# Patient Record
Sex: FEMALE | URBAN - METROPOLITAN AREA
[De-identification: names, ages, dates, MRNs, and addresses within clinical notes are randomized per-mention and may not be internally consistent; named-entity substitution may affect disease eponyms.]

---
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
## 2024-07-07 NOTE — ED PEDIATRIC NURSE NOTE - LOCATION
Pt seen & examined at bedside. Pt states odynophagia is improving today and was amenable to trying to drink more nutritional shakes and eat pureed food today, but has not had food yet. No F/C, N/V, CP/SOB. Tolerating tube feeds through NGT.    PHYSICAL EXAM:    CONSTITUTIONAL: well nourished, well developed, and in no acute distress.    EYES: pupils equal and round and no abnormalities of the conjunctivae and lids.   RESPIRATORY: respirations unlabored, no increased work of breathing with use of accessory muscles and retractions. NO STRIDOR.    CARDIAC: warm extremities, no cyanosis.  ABDOMEN: nondistended.  THORAX: no gross deformities, no pectus defects.   NEUROLOGIC: cranial nerves II - VII and IX - XII with no gross deficits.   MUSCULOSKELETAL: normal muscle STRENGTH, symmetry and tone of facial, head and neck musculature, no clubbing.   INTEGUMENTARY: no obvious skin rash, no skin lesions.  LYMPHATIC: no cervical lymphadenopathy.   PSYCHIATRIC: age APPROPRIATE behavior.   HEAD: normocephalic, atraumatic.    RIGHT EAR: The right pinna was normal.    LEFT EAR: The left pinna was normal.    NOSE: External Nose: normal  Anterior Nasal Cavity: the right nasal cavity was normal and the left nasal cavity was normal. NGT is taped in place with tube feeds running.    NECK: normal with no obvious cervical lesions  forehead